# Patient Record
Sex: FEMALE | Race: WHITE | NOT HISPANIC OR LATINO | URBAN - METROPOLITAN AREA
[De-identification: names, ages, dates, MRNs, and addresses within clinical notes are randomized per-mention and may not be internally consistent; named-entity substitution may affect disease eponyms.]

---

## 2017-02-01 ENCOUNTER — IMPORTED ENCOUNTER (OUTPATIENT)
Dept: URBAN - METROPOLITAN AREA CLINIC 31 | Facility: CLINIC | Age: 72
End: 2017-02-01

## 2017-02-01 PROBLEM — Z94.7: Noted: 2017-02-01

## 2017-02-01 PROBLEM — Z96.1: Noted: 2017-02-01

## 2017-02-01 PROBLEM — H40.041: Noted: 2017-02-01

## 2017-02-01 PROBLEM — H04.121: Noted: 2017-02-01

## 2017-02-01 PROCEDURE — 99214 OFFICE O/P EST MOD 30 MIN: CPT

## 2017-02-01 NOTE — PATIENT DISCUSSION
1.  Steroid Induced Glaucoma OD:  Discussed that intraocular pressure is elevated due to steroid medication. Continue present management. Add timolol q am change PRED to Lotemax 2x/d2. Dry Eye OD:  Continue current management with Artificial Tears. 3.  Transplant Doing well after transplant. Continue topical steroids. Rejection and vaccinations reviewed. 4. Pseudophakia OD - IOL stable. Monitor. Prosthesis OS doing well with TDX juan OS5. Return for an appointment in 1 month for pressure check. VF 24-2. with Dr. Erica Amaya.

## 2017-03-10 ENCOUNTER — IMPORTED ENCOUNTER (OUTPATIENT)
Dept: URBAN - METROPOLITAN AREA CLINIC 31 | Facility: CLINIC | Age: 72
End: 2017-03-10

## 2017-03-10 PROBLEM — H40.041: Noted: 2017-03-10

## 2017-03-10 PROBLEM — Z94.7: Noted: 2017-03-10

## 2017-03-10 PROBLEM — Z97.0: Noted: 2017-03-10

## 2017-03-10 PROBLEM — Z96.1: Noted: 2017-03-10

## 2017-03-10 PROCEDURE — 92083 EXTENDED VISUAL FIELD XM: CPT

## 2017-03-10 PROCEDURE — 99214 OFFICE O/P EST MOD 30 MIN: CPT

## 2017-03-10 NOTE — PATIENT DISCUSSION
1.  Steroid Induced Glaucoma OD:  Discussed that intraocular pressure is elevated due to steroid medication. Continue present management. 2.  Transplant Doing well after transplant. Continue topical steroids. Rejection and vaccinations reviewed. 3. Pseudophakia OD - IOL stable. Monitor. 4. Prothesis OS -- The patient has an artificial eye on the leftt. 5. Return for an appointment in 3 months for pressure check. with Dr. Ron Olivera.

## 2017-06-09 ENCOUNTER — IMPORTED ENCOUNTER (OUTPATIENT)
Dept: URBAN - METROPOLITAN AREA CLINIC 31 | Facility: CLINIC | Age: 72
End: 2017-06-09

## 2017-06-09 PROBLEM — H40.041: Noted: 2017-06-09

## 2017-06-09 PROBLEM — Z97.0: Noted: 2017-06-09

## 2017-06-09 PROBLEM — Z94.7: Noted: 2017-06-09

## 2017-06-09 PROCEDURE — 99214 OFFICE O/P EST MOD 30 MIN: CPT

## 2017-06-09 NOTE — PATIENT DISCUSSION
1.  Transplant High irregular astigmatism Continue topical steroids. Rejection and vaccinations reviewed. Layo Rivas time reading with glasses letters are tilted options include RGP/Scleral lens which she tried at Dr Madeline Dalton office pt could not put in by herself she is now in assisted living and may be able to have nurse trained to assist.Other options would be david linked [place] [id number] but would have to go to [facility]) for treatment or repeat PK. Return for an appointment in 2 weeks for contact lens evaluation. with Dr Vamsi Duong. Return for an appointment in 3 months for office call. with Dr. Deon Pack. 2.  Steroid Induced Glaucoma OD:  Discussed that intraocular pressure is elevated due to steroid medication. Continue present management. 3.  Prosthesis OS -- The patient has an artificial eye on the leftt.

## 2017-06-14 ENCOUNTER — IMPORTED ENCOUNTER (OUTPATIENT)
Dept: URBAN - METROPOLITAN AREA CLINIC 31 | Facility: CLINIC | Age: 72
End: 2017-06-14

## 2017-06-14 PROBLEM — Z96.1: Noted: 2017-06-14

## 2017-06-14 PROBLEM — H04.123: Noted: 2017-06-14

## 2017-06-14 PROBLEM — T86.848: Noted: 2017-06-14

## 2017-06-14 PROCEDURE — 92310 CONTACT LENS FITTING OU: CPT

## 2017-06-14 PROCEDURE — 99213 OFFICE O/P EST LOW 20 MIN: CPT

## 2017-06-14 NOTE — PATIENT DISCUSSION
1.  Dry Eye OU:  Continue current management with Artificial Tears. 2.  Pseudophakia OD - IOL stable. Monitor. 3. Graft complication of irregular astigmatism reviewed. Order scleral lens. To be seen at dispense. Will need I&R.4. Return for an appointment in 2 weeks for Contact lens dispense. with Dr. Blaise Win.  5.  Monitor prosthesis OS

## 2017-07-12 ENCOUNTER — IMPORTED ENCOUNTER (OUTPATIENT)
Dept: URBAN - METROPOLITAN AREA CLINIC 31 | Facility: CLINIC | Age: 72
End: 2017-07-12

## 2017-07-12 NOTE — PATIENT DISCUSSION
Good fit and comfort with new scleral lens but over minused. Order a new lens with power adjustment for exchange. I&R today.

## 2018-03-22 ENCOUNTER — IMPORTED ENCOUNTER (OUTPATIENT)
Dept: URBAN - METROPOLITAN AREA CLINIC 31 | Facility: CLINIC | Age: 73
End: 2018-03-22

## 2018-03-22 PROBLEM — T86.848: Noted: 2018-03-22

## 2018-03-22 PROCEDURE — 99213 OFFICE O/P EST LOW 20 MIN: CPT

## 2018-03-22 PROCEDURE — 92025 CPTRIZED CORNEAL TOPOGRAPHY: CPT

## 2018-03-22 PROCEDURE — 92015 DETERMINE REFRACTIVE STATE: CPT

## 2018-03-22 NOTE — PATIENT DISCUSSION
Refractive error - Update glasses. VA with old distorted because of high cyl. More comfortable when new rx trial framed. Poly and increase add.

## 2018-03-22 NOTE — PATIENT DISCUSSION
1.  Graft Complication reviewed. Irregular astigmatism. No scleral lens after discussion with patient because monocular and insertion and removal not only difficult but OD is only eye so any complication with the CL will adversely affect her vision. 2. Refractive error - Update glasses. VA with old distorted because of high cyl. More comfortable when new rx trial framed. Poly and increase add. Still to use magnifyer over glasses. 3. Return for an appointment in 6 months for office call. with Dr. Javier Mansfield.

## 2022-04-02 ASSESSMENT — VISUAL ACUITY
OD_PH: CC 20/40 +1
OD_SC: 20/50-2
OD_PH: CC 20/30 -2
OD_PH: CC 20/30 -1
OD_CC: 20/30+2
OD_SC: 20/40-2
OD_SC: 20/40-2

## 2022-04-02 ASSESSMENT — TONOMETRY
OD_IOP_MMHG: 29
OD_IOP_MMHG: 14
OD_IOP_MMHG: 16